# Patient Record
Sex: FEMALE | Employment: OTHER | URBAN - NONMETROPOLITAN AREA
[De-identification: names, ages, dates, MRNs, and addresses within clinical notes are randomized per-mention and may not be internally consistent; named-entity substitution may affect disease eponyms.]

---

## 2020-10-27 ENCOUNTER — OFFICE VISIT (OUTPATIENT)
Dept: OBGYN CLINIC | Age: 80
End: 2020-10-27
Payer: MEDICARE

## 2020-10-27 VITALS — SYSTOLIC BLOOD PRESSURE: 130 MMHG | DIASTOLIC BLOOD PRESSURE: 81 MMHG | WEIGHT: 172.8 LBS

## 2020-10-27 PROCEDURE — 99213 OFFICE O/P EST LOW 20 MIN: CPT | Performed by: NURSE PRACTITIONER

## 2020-10-27 RX ORDER — GABAPENTIN 300 MG/1
CAPSULE ORAL
COMMUNITY
Start: 2020-09-17

## 2020-10-27 RX ORDER — CLOTRIMAZOLE AND BETAMETHASONE DIPROPIONATE 10; .64 MG/G; MG/G
CREAM TOPICAL
COMMUNITY
Start: 2020-08-13 | End: 2020-11-17 | Stop reason: SDUPTHER

## 2020-10-27 RX ORDER — PRAVASTATIN SODIUM 10 MG
TABLET ORAL
COMMUNITY
Start: 2020-09-16

## 2020-10-27 RX ORDER — NYSTATIN 100000 [USP'U]/G
POWDER TOPICAL
COMMUNITY
Start: 2020-08-23

## 2020-10-27 RX ORDER — LATANOPROSTENE BUNOD 0.24 MG/ML
SOLUTION/ DROPS OPHTHALMIC
COMMUNITY
Start: 2020-10-17

## 2020-10-27 RX ORDER — DORZOLAMIDE HCL 20 MG/ML
SOLUTION/ DROPS OPHTHALMIC
COMMUNITY
Start: 2020-10-01

## 2020-10-27 RX ORDER — TIMOLOL MALEATE 5 MG/ML
SOLUTION/ DROPS OPHTHALMIC
COMMUNITY
Start: 2020-09-29

## 2020-10-27 RX ORDER — LEVOTHYROXINE SODIUM 100 MCG
TABLET ORAL
COMMUNITY
Start: 2020-09-16

## 2020-10-27 SDOH — HEALTH STABILITY: MENTAL HEALTH: HOW OFTEN DO YOU HAVE A DRINK CONTAINING ALCOHOL?: MONTHLY OR LESS

## 2020-10-27 NOTE — PROGRESS NOTES
PROBLEM VISIT     Date of service: 10/27/2020    Deniz Bass  Is a 79 y.o. female    PT's PCP is: Adele Rush DO     : 1940                                             Subjective:       No LMP recorded. Patient has had a hysterectomy. OB History    Para Term  AB Living   0 0 0 0 0 0   SAB TAB Ectopic Molar Multiple Live Births   0 0 0 0 0 0        Social History     Tobacco Use   Smoking Status Never Smoker   Smokeless Tobacco Never Used        Social History     Substance and Sexual Activity   Alcohol Use Yes    Frequency: Monthly or less       Allergies: Cephalexin; Clindamycin/lincomycin; and Neosporin [bacitracin-polymyxin b]      Current Outpatient Medications:     timolol (TIMOPTIC) 0.5 % ophthalmic solution, , Disp: , Rfl:     pravastatin (PRAVACHOL) 10 MG tablet, , Disp: , Rfl:     SYNTHROID 100 MCG tablet, , Disp: , Rfl:     VYZULTA 0.024 % SOLN, , Disp: , Rfl:     gabapentin (NEURONTIN) 300 MG capsule, , Disp: , Rfl:     dorzolamide (TRUSOPT) 2 % ophthalmic solution, , Disp: , Rfl:     NYSTATIN 245253 UNIT/GM powder, APPLY POWDER TOPICALLY TWICE DAILY AS NEEDED, Disp: , Rfl:     clotrimazole-betamethasone (LOTRISONE) 1-0.05 % cream, APPLY CREAM TO AFFECTED AREA TWICE DAILY AS NEEDED, Disp: , Rfl:     Social History     Substance and Sexual Activity   Sexual Activity Not Currently       Chief Complaint   Patient presents with    Vaginal Pain     C/O vaginal redness and burning. PE:  Vital Signs  Blood pressure 130/81, weight 172 lb 12.8 oz (78.4 kg). HPI: Patient here with complaints redness and burning of skin in right groin for several days. Has not yet started lotrisone cream.      PT denies fever, chills, nausea and vomiting       Review of Systems   Constitutional: Negative. Genitourinary: Negative for difficulty urinating, flank pain, vaginal bleeding and vaginal discharge.    Skin:        Redness, burning of skin in right groin Physical Exam  Constitutional:       Appearance: Normal appearance. HENT:      Head: Normocephalic. Pulmonary:      Effort: Pulmonary effort is normal.   Genitourinary:     General: Normal vulva. Comments: Golf ball size round area of redness in right groin area. Appearance consistent with yeast of skin   Musculoskeletal: Normal range of motion. Neurological:      General: No focal deficit present. Mental Status: She is alert. Psychiatric:         Mood and Affect: Mood normal.         Behavior: Behavior normal.       Assessment and Plan          Diagnosis Orders   1. Other specified disorders of the skin and subcutaneous tissue         Patient has used Lotrisone in past which cleared area of redness. Discussed keeping area dry and clean as moisture can cause skin irritation and fungal infection. Patient has nystatin powder- discussed small amount in skin folds each day. Patient also already has Lotrisone cream she will use       I am having José Miguel Mc. Hyacinth maintain her timolol, pravastatin, Synthroid, Vyzulta, gabapentin, dorzolamide, nystatin, and clotrimazole-betamethasone. Return if symptoms worsen or fail to improve. There are no Patient Instructions on file for this visit. Over 50% of time spent on counseling and care coordination on: see assessment and plan,  She was also counseled on her preventative health maintenance recommendations and follow-up.         FF time: 15 min      Gretchen Andrew,11/1/2020 10:27 AM

## 2020-11-17 ENCOUNTER — HOSPITAL ENCOUNTER (OUTPATIENT)
Age: 80
Setting detail: SPECIMEN
Discharge: HOME | End: 2020-11-17
Payer: MEDICARE

## 2020-11-17 ENCOUNTER — OFFICE VISIT (OUTPATIENT)
Dept: OBGYN CLINIC | Age: 80
End: 2020-11-17
Payer: MEDICARE

## 2020-11-17 VITALS — DIASTOLIC BLOOD PRESSURE: 88 MMHG | WEIGHT: 170 LBS | SYSTOLIC BLOOD PRESSURE: 152 MMHG

## 2020-11-17 PROCEDURE — 99213 OFFICE O/P EST LOW 20 MIN: CPT | Performed by: NURSE PRACTITIONER

## 2020-11-17 RX ORDER — GLUC/MSM/COLGN2/HYAL/ANTIARTH3 375-375-20
TABLET ORAL
COMMUNITY

## 2020-11-17 RX ORDER — CLOTRIMAZOLE AND BETAMETHASONE DIPROPIONATE 10; .64 MG/G; MG/G
CREAM TOPICAL
Qty: 1 TUBE | Refills: 3 | Status: SHIPPED | OUTPATIENT
Start: 2020-11-17

## 2020-11-17 RX ORDER — FLUCONAZOLE 150 MG/1
150 TABLET ORAL ONCE
Qty: 1 TABLET | Refills: 0 | Status: SHIPPED | OUTPATIENT
Start: 2020-11-17 | End: 2020-11-17

## 2020-11-17 RX ORDER — LANOLIN ALCOHOL/MO/W.PET/CERES
CREAM (GRAM) TOPICAL
COMMUNITY

## 2020-11-17 NOTE — PROGRESS NOTES
PROBLEM VISIT     Date of service: 2020    Luisa Muñiz  Is a 79 y.o.  female    PT's PCP is: Christella Siemens, DO     : 1940     Chaperone for Intimate Exam   Chaperone was offered as part of the rooming process. Patient declined and agrees to continue with exam without a chaperone. Subjective:       No LMP recorded. Patient has had a hysterectomy. OB History    Para Term  AB Living   0 0 0 0 0 0   SAB TAB Ectopic Molar Multiple Live Births   0 0 0 0 0 0        Social History     Tobacco Use   Smoking Status Never Smoker   Smokeless Tobacco Never Used        Social History     Substance and Sexual Activity   Alcohol Use Yes    Frequency: Monthly or less       Allergies: Cephalexin; Clindamycin/lincomycin;  Neosporin [bacitracin-polymyxin b]; and Petrolatum-zinc oxide      Current Outpatient Medications:     Calcium Carbonate-Vitamin D 600-200 MG-UNIT CAPS, Calcium Carbonate / Cholecalciferol Calcium 600 + D(3) 600 mg calcium- 200 unit oral capsule take 1 capsule by oral route daily   [facility] ([id number]), Disp: , Rfl:     glucosamine-chondroitin 500-400 MG tablet, Chondroitin Sulfates / Glucosamine glucosamine-chondroitin 500-400 mg oral tablet take 2 tablets by oral route   [facility] ([id number]), Disp: , Rfl:     clotrimazole-betamethasone (LOTRISONE) 1-0.05 % cream, APPLY CREAM TO AFFECTED AREA TWICE DAILY AS NEEDED, Disp: 1 Tube, Rfl: 3    timolol (TIMOPTIC) 0.5 % ophthalmic solution, , Disp: , Rfl:     pravastatin (PRAVACHOL) 10 MG tablet, , Disp: , Rfl:     SYNTHROID 100 MCG tablet, , Disp: , Rfl:     VYZULTA 0.024 % SOLN, , Disp: , Rfl:     gabapentin (NEURONTIN) 300 MG capsule, , Disp: , Rfl:     dorzolamide (TRUSOPT) 2 % ophthalmic solution, , Disp: , Rfl:     NYSTATIN 419874 UNIT/GM powder, APPLY POWDER TOPICALLY TWICE DAILY AS NEEDED, Disp: , Rfl:     Social History Substance and Sexual Activity   Sexual Activity Not Currently         Chief Complaint   Patient presents with    Vaginal Pain     C/O area of redness on vagina and labia. States there is a yellow \"dot\" there. Needs refill of cream you gave her. PE:  Vital Signs  Blood pressure (!) 152/88, weight 170 lb (77.1 kg). HPI: Patient here with complaints of redness in groin and labia. Reports it is starting to improve, but itches. Saw yellow \"dot\" on labia, denies any associated symptoms. PT denies fever, chills, nausea and vomiting       Review of Systems   Constitutional: Negative. Genitourinary: Positive for vaginal pain (redness, itching ). Physical Exam  HENT:      Head: Normocephalic. Pulmonary:      Effort: Pulmonary effort is normal.   Genitourinary:      Musculoskeletal: Normal range of motion. Neurological:      Mental Status: She is alert. Assessment and Plan          Diagnosis Orders   1. Other specified disorders of the skin and subcutaneous tissue  clotrimazole-betamethasone (LOTRISONE) 1-0.05 % cream    fluconazole (DIFLUCAN) 150 MG tablet    Vaginitis DNA Probe       Will try oral diflucan. Patient is having a hard time with Nystatin powder. Discussed putting some in clean hand and patting into groin area. Staying clean dry. Changing under garments if damp. Decreasing carbs and sugars in diet. Would like Lotrisone refill to have on hand. Discussed evaluation of hgb A1C due to recurrent yeast infections. Patient states recent one at PCP office was 5.9 so declines at this time. Patient will consider seeing dermatology. I am having Leodan Babrad. Hyacinth start on fluconazole. I am also having her maintain her timolol, pravastatin, Synthroid, Vyzulta, gabapentin, dorzolamide, nystatin, Calcium Carbonate-Vitamin D, glucosamine-chondroitin, and clotrimazole-betamethasone. Return if symptoms worsen or fail to improve.     There are no Patient Instructions on file for this visit. Over 50% of time spent on counseling and care coordination on: see assessment and plan,  She was also counseled on her preventative health maintenance recommendations and follow-up.         FF time: 15 min      Bernardino Andrew,11/21/2020 1:38 PM

## 2020-11-18 LAB
DIRECT EXAM: NORMAL
Lab: NORMAL
SPECIMEN DESCRIPTION: NORMAL